# Patient Record
Sex: MALE | Race: WHITE | Employment: OTHER | URBAN - METROPOLITAN AREA
[De-identification: names, ages, dates, MRNs, and addresses within clinical notes are randomized per-mention and may not be internally consistent; named-entity substitution may affect disease eponyms.]

---

## 2017-05-11 ENCOUNTER — OFFICE VISIT (OUTPATIENT)
Dept: OTOLARYNGOLOGY | Facility: CLINIC | Age: 82
End: 2017-05-11
Payer: MEDICARE

## 2017-05-11 DIAGNOSIS — Z98.890 MOHS DEFECT OF NOSE: Primary | ICD-10-CM

## 2017-05-11 DIAGNOSIS — M95.0 MOHS DEFECT OF NOSE: Primary | ICD-10-CM

## 2017-05-11 PROCEDURE — 99213 OFFICE O/P EST LOW 20 MIN: CPT | Performed by: OTOLARYNGOLOGY

## 2017-05-11 ASSESSMENT — PAIN SCALES - GENERAL: PAINLEVEL: NO PAIN (0)

## 2017-05-11 NOTE — PATIENT INSTRUCTIONS
Please contact our clinic if you have questions or if problems arise:    [place] office: [phone]  [facility] office: [phone]    If it is an urgent matter when the clinic is closed, please contact the [facility]  [phone] and ask to have Dr. Froy Munoz, or the ENT resident on-call paged. If it is a serious matter requiring immediate attention, please call 911 or go to your nearest emergency department.

## 2017-05-11 NOTE — PROGRESS NOTES
"CLINICAL SUMMARY:   Diagnoses:  Right nasal alar defect from basal cell carcinoma, s/p Mohs surgery by Dr. Anthony.    -10/12/16: stage 1 full thickness skin graft (donor = right neck) (path = benign skin).  -5/11/17: NOSE = 1     Comorbidities: None  Pertinent medications: None  Photographs:  consents signed October 5, 2016.  Other: Wife = Marina, [place] and [place]. Snowmobile. 5 Children. 8 Grandchildren.  Care Checklist:    _Cannot have catheter during a surgery because of bladder implant per the patient and his wife. Will need instructions on how to manage the device if we consider surgery.  _Sun protection.  _Dermatology for cancer surveillance.   _F/U as needed.       Facial Plastic and Reconstructive Surgery Note    Today I had the pleasure of seeing Mr. Peralta at the Facial Plastic and Reconstructive Surgery Clinic in the Department of Otolaryngology at [facility]. He underwent reconstruction several months ago. No pain, bleeding or discharge from the wound. He is very satisfied with the results of the reconstruction.    What do you think of your surgery result? \"I think it looks good. It hardly shows. No problems breathing through my nose.\" His wife said \"I think you made a david choice going with the skin graft.\"     On examination, the patient is in no apparent distress, no stridor, voice is strong.   All grafted tissue is 100% viable with adequate color and capillary refill. Borders are well healed.  Donor site: all tissue is viable and well healed.  Nostril margin has no significant change. No evidence of external nasal valve collapse.  No external nasal valve collapse.     IMPRESSION AND RECOMMENDATIONS: Nasal defect after Mohs surgery. He underwent skin graft reconstruction 6 months ago. The patient has recovered well and both he and I are satisfied with the result. We discussed the importance of sun protection, especially at the reconstruction " site. I recommend periodic full body dermatologic examinations for cancer surveillance. He can follow up with me on an as needed basis. I encouraged him to call or return at any time if he has questions or if I can be of service. It has been a pleasure to participate in the care of Mr. Peralta.    Froy Munoz MD    Division of Facial Plastic and Reconstructive Surgery,   Department of Otolaryngology  [facility]

## 2017-05-11 NOTE — MR AVS SNAPSHOT
After Visit Summary   5/11/2017    Carl Peralta    MRN: [id number]           Patient Information     Date Of Birth          4/7/1933        Visit Information        Provider Department      5/11/2017 12:00 PM Froy Munoz MD [facility]        Today's Diagnoses     Mohs defect of nose    -  1      Care Instructions    Please contact our clinic if you have questions or if problems arise:    [place] office: [phone]  [facility] office: [phone]    If it is an urgent matter when the clinic is closed, please contact the [facility]  [phone] and ask to have Dr. Froy Munoz, or the ENT resident on-call paged. If it is a serious matter requiring immediate attention, please call 911 or go to your nearest emergency department.          Follow-ups after your visit        Follow-up notes from your care team     Return if symptoms worsen or fail to improve.      Who to contact     If you have questions or need follow up information about today's clinic visit or your schedule please contact [facility] directly at [phone].  Normal or non-critical lab and imaging results will be communicated to you by [employer], letter or phone within 4 business days after the clinic has received the results. If you do not hear from us within 7 days, please contact the clinic through [employer] or phone. If you have a critical or abnormal lab result, we will notify you by phone as soon as possible.  Submit refill requests through [employer] or call your pharmacy and they will forward the refill request to us. Please allow 3 business days for your refill to be completed.          Additional Information About Your Visit        [employer] Information     [employer] is an electronic gateway that provides easy, online access to your medical records. With [employer], you can request a clinic appointment, read your test results, renew a prescription or  communicate with your care team.     To sign up for [email or web address] visit the website at [email or web address]   You will be asked to enter the access code listed below, as well as some personal information. Please follow the directions to create your username and password.     Your access code is: [email or web address]  Expires: 2017 12:38 PM     Your access code will  in 90 days. If you need help or a new code, please contact your [facility] or call [phone] for assistance.        Care EveryWhere ID     This is your Care EveryWhere ID. This could be used by other organizations to access your [facility] medical records  [id number]         Blood Pressure from Last 3 Encounters:   10/12/16 125/56   13 140/64   10/28/13 150/81    Weight from Last 3 Encounters:   10/12/16 77.1 kg (170 lb)   13 75.9 kg (167 lb 4.8 oz)   10/28/13 78.3 kg (172 lb 9.6 oz)              Today, you had the following     No orders found for display       Primary Care Provider Office Phone # Fax #    Von Rivera [phone]       [facility] [address]  [facility] [address]        Thank you!     Thank you for choosing [facility] [place]  for your care. Our goal is always to provide you with excellent care. Hearing back from our patients is one way we can continue to improve our services. Please take a few minutes to complete the written survey that you may receive in the mail after your visit with us. Thank you!             Your Updated Medication List - Protect others around you: Learn how to safely use, store and throw away your medicines at www.disposemymeds.org.          This list is accurate as of: 17 12:38 PM.  Always use your most recent med list.                   Brand Name Dispense Instructions for use    amLODIPine 10 MG tablet    NORVASC     Take 10 mg by mouth daily.       atenolol 50 MG tablet    TENORMIN     Take 50 mg by mouth daily.        ATORVASTATIN CALCIUM PO      Take 20 mg by mouth daily       mupirocin 2 % ointment    BACTROBAN    22 g    Apply topically every 2 hours (while awake) Follow instructions for ointment use on your discharge instructions from Dr. Munoz       naproxen 375 MG tablet    NAPROSYN     Take by mouth 2 times daily (with meals) Reported on 5/11/2017       TYLENOL PO      Take 975 mg by mouth once Reported on 5/11/2017

## 2017-05-11 NOTE — NURSING NOTE
"Carl Peralta's goals for this visit include:   Chief Complaint   Patient presents with     RECHECK       He requests these members of his care team be copied on today's visit information: Von Rivera      PCP: Von Rivera    Referring Provider:  No referring provider defined for this encounter.    Chief Complaint   Patient presents with     RECHECK       Initial There were no vitals taken for this visit. Estimated body mass index is 27.44 kg/(m^2) as calculated from the following:    Height as of 10/12/16: 1.676 m (5' 6\").    Weight as of 10/12/16: 77.1 kg (170 lb).  Medication Reconciliation: complete    Do you need any medication refills at today's visit? No    Jazmine Reese LPN      "

## 2020-08-20 ENCOUNTER — APPOINTMENT (OUTPATIENT)
Dept: URBAN - METROPOLITAN AREA CLINIC 259 | Age: 85
Setting detail: DERMATOLOGY
End: 2020-08-20

## 2020-08-20 DIAGNOSIS — D18.0 HEMANGIOMA: ICD-10-CM

## 2020-08-20 DIAGNOSIS — L57.0 ACTINIC KERATOSIS: ICD-10-CM

## 2020-08-20 DIAGNOSIS — L57.8 OTHER SKIN CHANGES DUE TO CHRONIC EXPOSURE TO NONIONIZING RADIATION: ICD-10-CM

## 2020-08-20 DIAGNOSIS — Z85.828 PERSONAL HISTORY OF OTHER MALIGNANT NEOPLASM OF SKIN: ICD-10-CM

## 2020-08-20 DIAGNOSIS — D22 MELANOCYTIC NEVI: ICD-10-CM

## 2020-08-20 PROBLEM — D18.01 HEMANGIOMA OF SKIN AND SUBCUTANEOUS TISSUE: Status: ACTIVE | Noted: 2020-08-20

## 2020-08-20 PROBLEM — D22.5 MELANOCYTIC NEVI OF TRUNK: Status: ACTIVE | Noted: 2020-08-20

## 2020-08-20 PROCEDURE — OTHER COUNSELING: OTHER

## 2020-08-20 PROCEDURE — 17003 DESTRUCT PREMALG LES 2-14: CPT

## 2020-08-20 PROCEDURE — OTHER LIQUID NITROGEN: OTHER

## 2020-08-20 PROCEDURE — 17000 DESTRUCT PREMALG LESION: CPT

## 2020-08-20 PROCEDURE — 99214 OFFICE O/P EST MOD 30 MIN: CPT | Mod: 25

## 2020-08-20 ASSESSMENT — LOCATION DETAILED DESCRIPTION DERM
LOCATION DETAILED: RIGHT NASAL ALA
LOCATION DETAILED: POSTERIOR MID-PARIETAL SCALP
LOCATION DETAILED: RIGHT MEDIAL UPPER BACK
LOCATION DETAILED: GLABELLA
LOCATION DETAILED: SUPERIOR THORACIC SPINE
LOCATION DETAILED: RIGHT SUPERIOR MEDIAL UPPER BACK

## 2020-08-20 ASSESSMENT — LOCATION ZONE DERM
LOCATION ZONE: NOSE
LOCATION ZONE: TRUNK
LOCATION ZONE: FACE
LOCATION ZONE: SCALP

## 2020-08-20 ASSESSMENT — LOCATION SIMPLE DESCRIPTION DERM
LOCATION SIMPLE: RIGHT NOSE
LOCATION SIMPLE: UPPER BACK
LOCATION SIMPLE: POSTERIOR SCALP
LOCATION SIMPLE: GLABELLA
LOCATION SIMPLE: RIGHT UPPER BACK

## 2020-08-20 NOTE — PROCEDURE: LIQUID NITROGEN
Post-Care Instructions: I reviewed with the patient in detail post-care instructions. Patient is to wear sunprotection, and avoid picking at any of the treated lesions. Pt may apply Vaseline to crusted or scabbing areas.
Detail Level: Zone
Consent: The patient's consent was obtained including but not limited to risks of crusting, scabbing, blistering, scarring, darker or lighter pigmentary change, recurrence, incomplete removal and infection.
Render Post-Care Instructions In Note?: yes
Duration Of Freeze Thaw-Cycle (Seconds): 0
Total Number Of Aks Treated: 6
Render In Bullet Format When Appropriate: No